# Patient Record
Sex: FEMALE | Race: WHITE
[De-identification: names, ages, dates, MRNs, and addresses within clinical notes are randomized per-mention and may not be internally consistent; named-entity substitution may affect disease eponyms.]

---

## 2017-06-26 ENCOUNTER — HOSPITAL ENCOUNTER (OUTPATIENT)
Dept: HOSPITAL 57 - BURRAD | Age: 82
Discharge: HOME | End: 2017-06-26
Payer: MEDICARE

## 2017-06-26 DIAGNOSIS — M54.6: ICD-10-CM

## 2017-06-26 DIAGNOSIS — M41.9: ICD-10-CM

## 2017-06-26 DIAGNOSIS — M47.816: ICD-10-CM

## 2017-06-26 DIAGNOSIS — G95.20: ICD-10-CM

## 2017-06-26 DIAGNOSIS — M54.5: Primary | ICD-10-CM

## 2017-06-26 DIAGNOSIS — M51.37: ICD-10-CM

## 2017-06-26 PROCEDURE — 72072 X-RAY EXAM THORAC SPINE 3VWS: CPT

## 2017-06-26 PROCEDURE — 72100 X-RAY EXAM L-S SPINE 2/3 VWS: CPT

## 2017-06-26 NOTE — RAD
THORACIC SPINE THREE VIEWS

6/26/17

 

Comparison is made with a 6/29/13 CT of the thoracic spine done at [facility].

 

The patient has known vertebroplasties at T10, T11, T12, and L2 which stabilize prior compressions. 
The bones are osteoporotic. L1 remains intact. A significant compression fracture of T9 is compresse
d to about the same degree as it was in 2013, about 50 to 60% loss in height. There is also a simila
r compression of T8 which was not present before. The levels above this appear grossly intact, thoug
h the patient's osteoporosis makes fine bony detail difficult to see. 

 

Arteriosclerotic change is seen in the aorta. There are dense annular calcifications in the mitral v
alve. 

 

IMPRESSION:  

1.      50-60% anterior compression of T8, a finding not present in 2013. I do not know if this is r
ecent or not. 

2.      50-60% compression of T9, an old finding and fairly stable. 

3.      Vertebroplasties involving T10, T11, T12 and L2. 

4.      Other findings as listed above. 

 

POS: HOME

## 2017-06-26 NOTE — RAD
LUMBAR SPINE THREE VIEWS

6/26/17

 

Comparison is made with a 6/29/13 CT scan done at [facility] that covered with lumbar spine
. 

 

Scoliosis is present, convexed left. There is a very minimal compression of L4 but it does not seem 
much different than the appearance from 2013. I do not see any new lumbar compressions. The bones ar
e quite osteoporotic which degrades detail. The L2 vertebroplasty is noted. A degenerated disc at L5
-S1 was present previously as well. The SI joints are difficult to evaluate due to overlying gas and
 fecal material. 

 

IMPRESSION:  

Scoliosis, mild degenerative change including degenerative disc disease at L5-S1. Minor compression 
of L4 that is no different than 2013. 

 

POS: HOME

## 2018-03-16 ENCOUNTER — HOSPITAL ENCOUNTER (EMERGENCY)
Dept: HOSPITAL 57 - BURERS | Age: 83
Discharge: NURSING, REHAB OR LONG-TERM CARE | End: 2018-03-16
Payer: MEDICARE

## 2018-03-16 DIAGNOSIS — F41.9: ICD-10-CM

## 2018-03-16 DIAGNOSIS — N39.0: Primary | ICD-10-CM

## 2018-03-16 DIAGNOSIS — I10: ICD-10-CM

## 2018-03-16 DIAGNOSIS — Z79.01: ICD-10-CM

## 2018-03-16 DIAGNOSIS — Z79.899: ICD-10-CM

## 2018-03-16 DIAGNOSIS — G20: ICD-10-CM

## 2018-03-16 DIAGNOSIS — E78.5: ICD-10-CM

## 2018-03-16 DIAGNOSIS — M81.0: ICD-10-CM

## 2018-03-16 DIAGNOSIS — I48.91: ICD-10-CM

## 2018-03-16 DIAGNOSIS — Z86.73: ICD-10-CM

## 2018-03-16 DIAGNOSIS — M85.80: ICD-10-CM

## 2018-03-16 LAB
BACTERIA UR QL AUTO: (no result) HPF
RBC UR QL AUTO: (no result) HPF (ref 0–3)
SP GR UR STRIP: 1.02 (ref 1–1.03)
UNIDENT CRYS #/AREA URNS HPF: (no result) HPF
URNS CMNT MICRO: (no result)
WBC UR QL AUTO: (no result) HPF (ref 0–3)

## 2018-03-16 PROCEDURE — 87086 URINE CULTURE/COLONY COUNT: CPT

## 2018-03-16 PROCEDURE — 51701 INSERT BLADDER CATHETER: CPT

## 2018-03-16 PROCEDURE — 81015 MICROSCOPIC EXAM OF URINE: CPT

## 2018-03-16 PROCEDURE — A4353 INTERMITTENT URINARY CATH: HCPCS

## 2018-03-16 PROCEDURE — 81003 URINALYSIS AUTO W/O SCOPE: CPT

## 2018-03-23 ENCOUNTER — HOSPITAL ENCOUNTER (EMERGENCY)
Dept: HOSPITAL 57 - BURERS | Age: 83
Discharge: ANOTHER HOSPITAL | End: 2018-03-23
Payer: MEDICARE

## 2018-03-23 ENCOUNTER — HOSPITAL ENCOUNTER (INPATIENT)
Dept: HOSPITAL 92 - ERS | Age: 83
LOS: 5 days | Discharge: NURSING, REHAB OR LONG-TERM CARE | DRG: 640 | End: 2018-03-28
Attending: INTERNAL MEDICINE | Admitting: INTERNAL MEDICINE
Payer: MEDICARE

## 2018-03-23 VITALS — BODY MASS INDEX: 19.5 KG/M2

## 2018-03-23 DIAGNOSIS — I10: ICD-10-CM

## 2018-03-23 DIAGNOSIS — I48.0: ICD-10-CM

## 2018-03-23 DIAGNOSIS — F32.9: ICD-10-CM

## 2018-03-23 DIAGNOSIS — Z86.73: ICD-10-CM

## 2018-03-23 DIAGNOSIS — I48.91: ICD-10-CM

## 2018-03-23 DIAGNOSIS — E78.5: ICD-10-CM

## 2018-03-23 DIAGNOSIS — Z79.899: ICD-10-CM

## 2018-03-23 DIAGNOSIS — G93.40: ICD-10-CM

## 2018-03-23 DIAGNOSIS — M81.0: ICD-10-CM

## 2018-03-23 DIAGNOSIS — N39.0: ICD-10-CM

## 2018-03-23 DIAGNOSIS — Z66: ICD-10-CM

## 2018-03-23 DIAGNOSIS — F41.9: ICD-10-CM

## 2018-03-23 DIAGNOSIS — F02.80: ICD-10-CM

## 2018-03-23 DIAGNOSIS — E03.9: ICD-10-CM

## 2018-03-23 DIAGNOSIS — K59.00: ICD-10-CM

## 2018-03-23 DIAGNOSIS — Z79.01: ICD-10-CM

## 2018-03-23 DIAGNOSIS — G20: ICD-10-CM

## 2018-03-23 DIAGNOSIS — M19.91: ICD-10-CM

## 2018-03-23 DIAGNOSIS — E44.0: ICD-10-CM

## 2018-03-23 DIAGNOSIS — B96.89: ICD-10-CM

## 2018-03-23 DIAGNOSIS — K21.9: ICD-10-CM

## 2018-03-23 DIAGNOSIS — N39.0: Primary | ICD-10-CM

## 2018-03-23 DIAGNOSIS — K52.89: ICD-10-CM

## 2018-03-23 DIAGNOSIS — E86.0: Primary | ICD-10-CM

## 2018-03-23 DIAGNOSIS — G62.9: ICD-10-CM

## 2018-03-23 DIAGNOSIS — Z95.0: ICD-10-CM

## 2018-03-23 LAB
ACTUAL BICARBONATE (HCO3V): 26 MEQ/L (ref 24–30)
ALBUMIN SERPL BCG-MCNC: 3.7 G/DL (ref 3.4–4.8)
ALP SERPL-CCNC: 68 U/L (ref 40–150)
ALT SERPL W P-5'-P-CCNC: 7 U/L (ref 8–55)
ANION GAP SERPL CALC-SCNC: 15 MMOL/L (ref 10–20)
AST SERPL-CCNC: 23 U/L (ref 5–34)
BACTERIA UR QL AUTO: (no result) HPF
BASE EXCESS BLDA CALC-SCNC: 0.7 MEQ/L
BILIRUB SERPL-MCNC: 0.6 MG/DL (ref 0.2–1.2)
BUN SERPL-MCNC: 22 MG/DL (ref 9.8–20.1)
CALCIUM SERPL-MCNC: 10.1 MG/DL (ref 7.8–10.44)
CHLORIDE SERPL-SCNC: 106 MMOL/L (ref 98–107)
CK MB SERPL-MCNC: 1.9 NG/ML (ref 0–6.6)
CO2 SERPL-SCNC: 27 MMOL/L (ref 23–31)
CREAT CL PREDICTED SERPL C-G-VRATE: 0 ML/MIN (ref 70–130)
GLOBULIN SER CALC-MCNC: 3.6 G/DL (ref 2.4–3.5)
GLUCOSE SERPL-MCNC: 128 MG/DL (ref 83–110)
HEMOGLOBIN (HB): 15.7 G/DL (ref 11.7–16.1)
HGB BLD-MCNC: 14.9 G/DL (ref 12–16)
HYALINE CASTS #/AREA URNS LPF: (no result) LPF
MCH RBC QN AUTO: 30.7 PG (ref 27–31)
MCV RBC AUTO: 91.8 FL (ref 81–99)
MDIFF COMPLETE?: YES
NEUTS HYPERSEG BLD QL SMEAR: SLIGHT
PLATELET # BLD AUTO: 278 THOU/UL (ref 130–400)
POTASSIUM SERPL-SCNC: 4.8 MMOL/L (ref 3.5–5.1)
PROT UR STRIP.AUTO-MCNC: (no result) MG/DL
RBC # BLD AUTO: 4.84 MILL/UL (ref 4.2–5.4)
SODIUM SERPL-SCNC: 143 MMOL/L (ref 136–145)
SP GR UR STRIP: 1.01 (ref 1–1.03)
TROPONIN I SERPL DL<=0.01 NG/ML-MCNC: 0.01 NG/ML (ref ?–0.03)
UNIDENT CRYS #/AREA URNS HPF: (no result) HPF
URNS CMNT MICRO: (no result)
WBC # BLD AUTO: 7.7 THOU/UL (ref 4.8–10.8)
WBC UR QL AUTO: (no result) HPF (ref 0–3)

## 2018-03-23 PROCEDURE — 84484 ASSAY OF TROPONIN QUANT: CPT

## 2018-03-23 PROCEDURE — 96366 THER/PROPH/DIAG IV INF ADDON: CPT

## 2018-03-23 PROCEDURE — 87186 SC STD MICRODIL/AGAR DIL: CPT

## 2018-03-23 PROCEDURE — 82805 BLOOD GASES W/O2 SATURATION: CPT

## 2018-03-23 PROCEDURE — 80053 COMPREHEN METABOLIC PANEL: CPT

## 2018-03-23 PROCEDURE — 87149 DNA/RNA DIRECT PROBE: CPT

## 2018-03-23 PROCEDURE — 83605 ASSAY OF LACTIC ACID: CPT

## 2018-03-23 PROCEDURE — 85025 COMPLETE CBC W/AUTO DIFF WBC: CPT

## 2018-03-23 PROCEDURE — 87077 CULTURE AEROBIC IDENTIFY: CPT

## 2018-03-23 PROCEDURE — 87040 BLOOD CULTURE FOR BACTERIA: CPT

## 2018-03-23 PROCEDURE — S0028 INJECTION, FAMOTIDINE, 20 MG: HCPCS

## 2018-03-23 PROCEDURE — 74018 RADEX ABDOMEN 1 VIEW: CPT

## 2018-03-23 PROCEDURE — 82553 CREATINE MB FRACTION: CPT

## 2018-03-23 PROCEDURE — 36416 COLLJ CAPILLARY BLOOD SPEC: CPT

## 2018-03-23 PROCEDURE — 51702 INSERT TEMP BLADDER CATH: CPT

## 2018-03-23 PROCEDURE — 70450 CT HEAD/BRAIN W/O DYE: CPT

## 2018-03-23 PROCEDURE — 87086 URINE CULTURE/COLONY COUNT: CPT

## 2018-03-23 PROCEDURE — 71045 X-RAY EXAM CHEST 1 VIEW: CPT

## 2018-03-23 PROCEDURE — 93005 ELECTROCARDIOGRAM TRACING: CPT

## 2018-03-23 PROCEDURE — A4216 STERILE WATER/SALINE, 10 ML: HCPCS

## 2018-03-23 PROCEDURE — 36415 COLL VENOUS BLD VENIPUNCTURE: CPT

## 2018-03-23 PROCEDURE — 74176 CT ABD & PELVIS W/O CONTRAST: CPT

## 2018-03-23 PROCEDURE — 81003 URINALYSIS AUTO W/O SCOPE: CPT

## 2018-03-23 PROCEDURE — A4353 INTERMITTENT URINARY CATH: HCPCS

## 2018-03-23 PROCEDURE — 81015 MICROSCOPIC EXAM OF URINE: CPT

## 2018-03-23 PROCEDURE — 96367 TX/PROPH/DG ADDL SEQ IV INF: CPT

## 2018-03-23 PROCEDURE — 85610 PROTHROMBIN TIME: CPT

## 2018-03-23 PROCEDURE — 96365 THER/PROPH/DIAG IV INF INIT: CPT

## 2018-03-23 NOTE — RAD
PORTABLE  AP CHEST X-RAY:

3/23/18

 

HISTORY: 

Altered mental status, confused. 

 

COMPARISON:  

4/25/17.

 

FINDINGS:  

Dual lead left subclavian cardiac pacemaking device again noted in place. Loop recording device again
 overlies the left chest. Vertebroplasty changes in thoracic and upper lumbar spine are again present
. Vascular calcifications again seen in a mildly ectatic thoracic aorta. There are mild chronic lung 
changes. Bilateral breast prostheses are present. Lungs are clear. There has been no interval change 
from prior exam.

 

IMPRESSION:  

1.      No acute cardiopulmonary process. 

2.      Osteopenia.

 

POS: [facility]

## 2018-03-23 NOTE — CT
NONCONTRAST CT ABDOMEN AND PELVIS

3/23/18

 

HISTORY: 

Altered mental status, confusion, combative, abdominal tenderness. 

 

COMPARISON:  

6/24/17.

 

FINDINGS:  

There are peripherally calcified bilateral breast prostheses again noted. There is partial visualizat
ion of the cardiac pacemaking leads. 

 

Vertebroplasty changes are seen at multiple levels lower thoracic spine as well as involving the lumb
ar spine. Vascular calcifications seen in the visualized thoracic aorta as well as involving the abdo
sherif aorta and iliac arteries and coronary arteries. 

 

There is a tiny left pleural effusion with atelectasis. Atelectasis also present at the right lung ba
se. 

 

Again noted is a moderate sized hiatal hernia. Walls of the stomach do appear to be thickened, this c
ould be related to incomplete distention although thickening related to gastritis could not be exclud
ed. 

 

There is a stable low density lesion again present in the superior pole left kidney. This was also se
en on prior study on 2/2/16 and probably represents a renal cyst. 

 

Again noted is the calcification in the region of the pancreatic head. This is in close proximity to 
the common duct, but I am unsure if this adjacent to the common duct or within the common duct. Never
theless, this is a stable finding compared to studies in 2017 and 2016.

 

The liver, spleen, pancreas, and right kidney demonstrate a grossly normal nonenhanced CT appearance.
 There is stable thickening of the bilateral adrenal glands without discrete nodule present. 

 

Uterus demonstrates a grossly normal CAT appearance for patient's age. 

 

Cox catheter is present in the urinary bladder, and there is also gas in the urinary bladder probab
ly related to recent catheterization. In addition, there is increased density material in the urinary
 bladder, in the absence of contrast administration, this suggests hemorrhage within the urinary blad
fouzia. There is a large amount of retained fecal material seen within the rectum with moderate amount o
f retained fecal material seen throughout the remainder of the colon suggesting constipation. There i
s minimal stranding in the presacral space posterior to the colon. Stercoral colitis cannot be entire
ly excluded based on this exam. 

 

Degenerative changes are seen in the spine and there are stable postsurgical changes of the right hip
. There is osteopenia. 

 

There has been no other interval change from the prior exam.

 

IMPRESSION:  

1.      Moderate sized hiatal hernia with thickening of the walls of the stomach. This could be relat
ed to incomplete distention, but gastritis is a differential consideration as well. 

2.      Large amount of retained fecal material in the colon with mild presacral inflammatory changes
 seen. Stercoral colitis could not entirely excluded based on this exam. 

3.      Constipation.

4.      Stable hypodense left renal lesion statistically likely representing a cyst. 

5.      Calcification in the pancreatic head near the region of the common duct. However, this is sta
ble since study on 2/2/16.

 

POS: [facility]

## 2018-03-23 NOTE — CT
EXAM:

NONCONTRAST HEAD CT

3/23/18

 

HISTORY: 

Altered mental status. 

 

COMPARISON:  

2/2/16.

 

TECHNIQUE:   

Noncontrast head CT is performed from the skull base to skull vertex. 

 

FINDINGS:  

No  parenchymal hemorrhage. No extra-axial hematoma. No midline shift. Basilar cisterns are patent. B
rain volume, age appropriate. Cortical gray-white matter differentiation is preserved.

 

Ventricle and sulci are patent and symmetric. Confluent white matter hypodensities due to chronic sma
ll vessel ischemic change. 

 

Stable calcifications/mineralization of the left deep gray matter structures, at the level of the gen
u of the left internal capsule. 

 

Calvarium is intact. Adequate aeration of the sinuses and mastoid air cells. Stable foreign body proj
ecting over the anterior aspect of the left frontal scalp.

 

IMPRESSION:  

1.      No acute intracranial process. 

2.      Age appropriate atrophy.

3.      Chronic small vessel ischemic change of the white matter. 

 

POS: [facility]

## 2018-03-24 LAB
CRYSTALS URNS QL MICRO: (no result) HPF
HEV IGM SER QL: 98.9 (ref 0–7.99)
HYALINE CASTS #/AREA URNS LPF: (no result) LPF
PATHC CAST-AUWI FLAG: 144.19 (ref 0–2.49)
PROT UR STRIP.AUTO-MCNC: (no result) MG/DL
RBC UR QL AUTO: (no result) HPF (ref 0–3)
SP GR UR STRIP: 1.02 (ref 1–1.04)
SPERM-AUWI FLAG: 0 (ref 0–9.9)
WBC UR QL AUTO: (no result) HPF (ref 0–3)
YEAST-AUWI FLAG: 839.9 (ref 0–25)

## 2018-03-24 RX ADMIN — FAMOTIDINE SCH MG: 10 INJECTION, SOLUTION INTRAVENOUS at 21:14

## 2018-03-24 NOTE — HP
PRIMARY CARE PHYSICIAN:  Dr. Rayna Frias.

 

REASON FOR ADMISSION:  Transfer from [place] [facility] for altered mental status.

 

HISTORY OF PRESENT ILLNESS:  This is an 87-year-old female, who lives at the nursing home in [place]
.  From there, she was taken to [facility] for altered mental status.  The patient is no
t able to provide any history.  The patient was taken to [place] [facility] and she was found wi
th urinary tract infection.  Based on lab testing, she was also found clinically dehydrated.  Patient
 was hypertensive and febrile.  She was saturating 88% on room air and temperature was 100.4 and bloo
d pressure was 211/91.  The patient was given Rocephin and vancomycin, and subsequently, this patient
 was transferred to our emergency room for admission.  In our emergency room, patient was not able to
 provide any history.  She was still hypertensive.  Subsequently, this patient was admitted to [facility] floor.

 

REVIEW OF SYSTEMS:  All review of systems tried to review with the patient, but unable to review at t
his point, because of altered mental status.

 

ALLERGIES:  ASPIRIN, PENICILLIN, PLAVIX, IODINE.

 

The patient is not able to get MRI, because of metal above the left eye.

 

CURRENT HOME MEDICATIONS:  Lipitor 40 mg p.o. daily, carbidopa/levodopa 25/100 one tablet 3 times tai
ly, Cardizem- mg p.o. daily, Synthroid 88 mcg p.o. daily, digoxin 125 mcg p.o. daily, ferrous s
ulfate 325 mg p.o. daily, MiraLax 17 grams p.o. daily, potassium chloride 20 mEq p.o. daily, vitamin 
D3 2000 units p.o. daily, Xarelto 20 mg p.o. daily, Remeron 15 mg p.o. daily, Aricept 5 mg p.o. daily
, ranitidine 150 mg p.o. daily.

 

PAST MEDICAL HISTORY:  Osteoarthritis, osteoporosis, hypertension, atrial fibrillation, chronic antic
oagulation, history of CVA, Parkinson's disease, dementia, hypothyroidism, dyslipidemia, gastroesopha
geal reflux disease, chronic deconditioning.

 

PAST SURGICAL HISTORY:  Breast augmentation.  Hip surgery.

 

PAST PSYCHIATRIC HISTORY:  Anxiety and depression.

 

SOCIAL HISTORY:  Patient lives at nursing home.  No history of tobacco, alcohol, or illicit drug abus
e.

 

FAMILY HISTORY:  No strong family history of premature coronary artery disease, stroke, or cancer.

 

EMERGENCY ROOM COURSE:  Patient is given vancomycin and Rocephin at [place] [facility].

 

PHYSICAL EXAMINATION:

VITAL SIGNS:  On arrival to [place] [facility], blood pressure 211/91, pulse 77, respiratory rat
e 24, temperature 100.4, saturation 88% on room air, weight 53.9 kilograms.

GENERAL:  Patient is currently hypertensive, ill-appearing, not following any command.

HEENT:  Head:  Normocephalic, atraumatic.  Eyes:  Pupils round, reactive to light.  No nystagmus.  EN
T:  Dry mucous membrane, no oral lesion, no pharyngeal erythema, no exudate.

NECK:  Supple, no JVD, no thyromegaly, no carotid bruit, no jugular venous distention.

LUNGS:  Clear to auscultation, though air entry reduced on both sides at base.  No wheeze, no rhonchi
, no rales.  No accessory muscles of respiration in use.

CARDIAC:  S1 and S2 appear regular.  Pacemaker in place in right chest wall.

ABDOMEN:  Soft, bowel sounds present, nontender, nondistended.  Bowel sounds present, no organomegaly
, no mass.

GENITOURINARY:  Cox catheter in place.

BACK EXAMINATION:  Unremarkable.  No CVA tenderness.

EXTREMITIES:  Upper extremities, passive movement of all joints are normal.  Lower extremities:  Pass
sharron movement of all joints are normal.  No edema.  Good peripheral pulsation.

SKIN:  No skin rash.

HEMATOLOGICAL SYSTEM:  No lymphadenopathy.

PSYCHIATRIC:  Flat affect.

NEUROLOGIC:  She is moving all 4 limbs.  Detailed neurological examination is not possible, because s
he is not following any command.

 

SIGNIFICANT LABORATORY DATA:  EKG showing pacemaker rhythm.  CT brain, based on my review, no acute i
ntracranial process, age appropriate atrophy, chronic small vessel ischemic changes.  Abdomen and pel
vis CT scan showing moderate-sized hiatal hernia with thickening of the walls of the stomach.  Large 
amount of fecal material in colon, constipation, calcification of pancreatic head.  Chest x-ray, base
d on my review, no acute cardiopulmonary process, osteopenia, pacemaker in place.

 

CBC:  WBC 7.7, hemoglobin 14.9, platelet 278 with bandemia.

 

VBG:  pH 7.38, CO2 of 45.2, bicarbonate 26, O2 of 62.7.  BMP:  Sodium 143, potassium 4.8, chloride 10
6, carbon dioxide 27, anion gap 15, BUN 22, creatinine 0.99, glucose 128, calcium 10.1.  Lactic acid 
2.1.

 

LFT:  AST 23, ALT 7, alkaline phosphatase 68, albumin 3.7, CK-MB 1.9, troponin I 0.015.  Urinalysis s
uggestive of urinary tract infection.

 

ASSESSMENT AND PLAN:

1.  Acute encephalopathy, multifactorial etiology including urinary tract infection and dehydration.

2.  Urinary tract infection.  We will continue levofloxacin IV daily.  Follow up on urine culture res
ult and modify antibiotic therapy based on culture and sensitivity result.

3.  Stercoral colitis with severe constipation.  We will give her Fleet enema.

4.  Moderate hiatal hernia with thickening wall of the stomach.  We will continue Pepcid 20 mg IV b.i
.d.

5.  Parkinson's disease.  Continue Sinemet-CR 25/100 one tablet q.i.d.

6.  Dementia.  Continue Aricept 5 mg p.o. at bedtime.

7.  Atrial fibrillation, currently rate controlled.  Continue digoxin 0.125 mg p.o. daily, Cardizem-C
D 240 mg p.o. daily along with chronic anticoagulation with Xarelto 20 mg p.o. daily.

8.  History of sick sinus syndrome with pacemaker.

9.  Hypothyroidism.  Continue Synthroid 88 mcg p.o. daily.

10.  Dyslipidemia.  Continue Lipitor 40 mg p.o. at bedtime.

11.  Bandemia, likely related with underlying infection.

12.  Dehydration.  Patient will be given IV fluid with dextrose half-normal saline at 75 mL per hour.


13.  Moderate protein-calorie malnutrition.  The patient will be given nutritional supplement when pa
jonathannt able to take p.o.

14.  Deep venous thrombosis prophylaxis.  Patient is already on Xarelto therapy.

15.  Gastrointestinal prophylaxis.  Pepcid 20 mg IV b.i.d.

16.  Code status.  At this point, the patient will be kept as a FULL CODE.  Patient does not have any
 surrogate decision maker.  The patient cannot provide any history at this point, so unable to confir
m and that is why we will keep as a FULL CODE status.

 

Disposition plan, based on clinical course.  We are expecting patient's stay in hospital more than 2 
midnights.  Plan of care discussed with the patient in detail.

## 2018-03-25 LAB
ALBUMIN SERPL BCG-MCNC: 3.1 G/DL (ref 3.4–4.8)
ALP SERPL-CCNC: 47 U/L (ref 40–150)
ALT SERPL W P-5'-P-CCNC: 9 U/L (ref 8–55)
ANION GAP SERPL CALC-SCNC: 8 MMOL/L (ref 10–20)
AST SERPL-CCNC: 12 U/L (ref 5–34)
BASOPHILS # BLD AUTO: 0.1 THOU/UL (ref 0–0.2)
BASOPHILS NFR BLD AUTO: 0.7 % (ref 0–1)
BILIRUB SERPL-MCNC: 0.6 MG/DL (ref 0.2–1.2)
BUN SERPL-MCNC: 16 MG/DL (ref 9.8–20.1)
CALCIUM SERPL-MCNC: 8.9 MG/DL (ref 7.8–10.44)
CHLORIDE SERPL-SCNC: 103 MMOL/L (ref 98–107)
CO2 SERPL-SCNC: 29 MMOL/L (ref 23–31)
CREAT CL PREDICTED SERPL C-G-VRATE: 41 ML/MIN (ref 70–130)
EOSINOPHIL # BLD AUTO: 0.4 THOU/UL (ref 0–0.7)
EOSINOPHIL NFR BLD AUTO: 4.9 % (ref 0–10)
GLOBULIN SER CALC-MCNC: 2.7 G/DL (ref 2.4–3.5)
GLUCOSE SERPL-MCNC: 128 MG/DL (ref 83–110)
HGB BLD-MCNC: 13.9 G/DL (ref 12–16)
LYMPHOCYTES # BLD: 1.8 THOU/UL (ref 1.2–3.4)
LYMPHOCYTES NFR BLD AUTO: 23.9 % (ref 21–51)
MCH RBC QN AUTO: 30.3 PG (ref 27–31)
MCV RBC AUTO: 95.4 FL (ref 81–99)
MONOCYTES # BLD AUTO: 1 THOU/UL (ref 0.11–0.59)
MONOCYTES NFR BLD AUTO: 13.6 % (ref 0–10)
NEUTROPHILS # BLD AUTO: 4.3 THOU/UL (ref 1.4–6.5)
NEUTROPHILS NFR BLD AUTO: 57 % (ref 42–75)
PLATELET # BLD AUTO: 256 THOU/UL (ref 130–400)
POTASSIUM SERPL-SCNC: 3.8 MMOL/L (ref 3.5–5.1)
RBC # BLD AUTO: 4.59 MILL/UL (ref 4.2–5.4)
SODIUM SERPL-SCNC: 136 MMOL/L (ref 136–145)
WBC # BLD AUTO: 7.6 THOU/UL (ref 4.8–10.8)

## 2018-03-25 RX ADMIN — FAMOTIDINE SCH MG: 10 INJECTION, SOLUTION INTRAVENOUS at 11:07

## 2018-03-25 NOTE — PDOC.PN
- Subjective


Encounter Start Date: 03/25/18


Encounter Start Time: 09:10


-: old records requested/rev





Patient seen and examined. No new complaints. No overnight events





- Objective


Resuscitation Status: 


 











Resuscitation Status           DNR:Do Not Resuscitate














MAR Reviewed: Yes


Vital Signs & Weight: 


 Vital Signs (12 hours)











  Temp Pulse Resp BP Pulse Ox


 


 03/25/18 05:17      94 L


 


 03/25/18 05:01  97.8 F  76  20  137/76  96








 Weight











Admit Weight                   105 lb 9.6 oz


 


Weight                         111 lb 8 oz














I&O: 


 











 03/24/18 03/25/18 03/26/18





 06:59 06:59 06:59


 


Intake Total  1860 


 


Output Total  675 


 


Balance  1185 











Result Diagrams: 


 03/25/18 05:59





 03/25/18 05:59


EKG Reviewed by me: Yes





Phys Exam





- Physical Examination


Constitutional: NAD


HEENT: PERRLA, sclera anicteric


dry MM


Neck: no JVD, supple


Respiratory: no wheezing, no rales, no rhonchi


Cardiovascular: RRR, no significant murmur, no rub


Gastrointestinal: soft, non-tender, no distention, positive bowel sounds


Musculoskeletal: no edema, pulses present


Neurological: moves all 4 limbs


Lymphatic: no nodes


Psychiatric: normal affect


Skin: no rash, normal turgor





Dx/Plan


(1) UTI (urinary tract infection)


Status: Acute   





(2) Dehydration


Code(s): E86.0 - DEHYDRATION   Status: Acute   





(3) Encephalopathy acute


Code(s): G93.40 - ENCEPHALOPATHY, UNSPECIFIED   Status: Acute   





(4) Protein-calorie malnutrition, moderate


Code(s): E44.0 - MODERATE PROTEIN-CALORIE MALNUTRITION   Status: Chronic   





(5) Physical deconditioning


Code(s): R53.81 - OTHER MALAISE   Status: Chronic   





(6) Paroxysmal atrial fibrillation


Code(s): I48.0 - PAROXYSMAL ATRIAL FIBRILLATION   Status: Chronic   





(7) Dementia


Code(s): F03.90 - UNSPECIFIED DEMENTIA WITHOUT BEHAVIORAL DISTURBANCE   Status: 

Chronic   





(8) GERD (gastroesophageal reflux disease)


Code(s): K21.9 - GASTRO-ESOPHAGEAL REFLUX DISEASE WITHOUT ESOPHAGITIS   Status: 

Chronic   





(9) H/O: CVA (cerebrovascular accident)


Code(s): Z86.73 - PRSNL HX OF TIA (TIA), AND CEREB INFRC W/O RESID DEFICITS   

Status: Chronic   





(10) Hyperlipidemia


Code(s): E78.5 - HYPERLIPIDEMIA, UNSPECIFIED   Status: Chronic   





(11) Hypertension


Code(s): I10 - ESSENTIAL (PRIMARY) HYPERTENSION   Status: Chronic   





(12) Neuropathy


Code(s): G62.9 - POLYNEUROPATHY, UNSPECIFIED   Status: Chronic   





(13) Parkinson disease


Code(s): G20 - PARKINSON'S DISEASE   Status: Chronic   





- Plan


cont current plan of care, pearson catheter, continue antibiotics





* continue gentle IVF


* code status - DNR confirmed


* continue IV levaquin


* follow culture


* diet as tolerated


* medication reviewed as below 


* symptomatic treatment


* continue selected home medication.








Review of Systems





- Review of Systems


Other: 





unable to review due to severe dementia and encephalopathy





- Medications/Allergies


Allergies/Adverse Reactions: 


 Allergies











Allergy/AdvReac Type Severity Reaction Status Date / Time


 


aspirin Allergy Severe  Verified 04/18/17 13:09


 


Penicillins Allergy Severe  Verified 04/18/17 13:09


 


clopidogrel bisulfate Allergy   Verified 04/18/17 13:09





[From Plavix]     


 


Iodine and Iodide Containing Allergy   Verified 04/18/17 13:09





Produc     


 


No MRI metal above left eye Allergy   Uncoded 07/19/13 12:22











Medications: 


 Current Medications





Acetaminophen (Tylenol)  650 mg PO Q4H PRN


   PRN Reason: Headache/Fever or Pain


Hydrocodone Bitart/Acetaminophen (Norco 5/325)  1 tab PO Q4H PRN


   PRN Reason: Moderate Pain (4-6)


Acyclovir (Zovirax)  800 mg PO TID [facility]


   Last Admin: 03/24/18 21:13 Dose:  800 mg


Al Hydroxide/Mg Hydroxide (Maalox)  30 ml PO Q6H PRN


   PRN Reason: Heartburn  or Indigestion


Artificial Tears (Tears Naturale)  0 drop EA EYE PRN PRN


   PRN Reason: Dry Eyes


Atorvastatin Calcium (Lipitor)  40 mg PO HS [facility]


   Last Admin: 03/24/18 21:14 Dose:  40 mg


Carbidopa/Levodopa (Sinemet Cr 50/200)  0.5 tab PO 0700,1200,1500,2100 [facility]


   Last Admin: 03/25/18 06:15 Dose:  0.5 tab


Cholecalciferol (Vitamin D3)  2,000 units PO DAILY [facility]


   Last Admin: 03/24/18 14:46 Dose:  2,000 units


Clonidine (Catapres)  0.1 mg PO BIDPRN PRN


   PRN Reason: sbp>180


Digoxin (Lanoxin)  0.125 mg PO DAILY [facility]


   Last Admin: 03/24/18 14:46 Dose:  0.125 mg


Diltiazem HCl (Cardizem Cd)  240 mg PO DAILY [facility]


   Last Admin: 03/24/18 14:45 Dose:  240 mg


Donepezil HCl (Aricept)  5 mg PO HS [facility]


   Last Admin: 03/24/18 21:14 Dose:  5 mg


Famotidine (Pepcid)  20 mg SLOW IVP BID [facility]


   Last Admin: 03/24/18 21:14 Dose:  20 mg


Ferrous Sulfate (Feosol)  325 mg PO QAM-WM [facility]


   Last Admin: 03/24/18 14:45 Dose:  325 mg


Guaifenesin (Robitussin Sf)  200 mg PO Q4H PRN


   PRN Reason: Cough


Hydralazine HCl (Apresoline)  10 mg SLOW IVP Q4H PRN


   PRN Reason: Systolic BP > 180


Dextrose/Sodium Chloride (D5 1/2 Ns)  1,000 mls @ 75 mls/hr IV .[id number] [facility]


   Last Admin: 03/24/18 21:10 Dose:  1,000 mls


Levofloxacin 500 mg/ Device  100 mls @ 100 mls/hr IVPB 0800 [facility]


   Last Admin: 03/24/18 09:45 Dose:  100 mls


Levothyroxine Sodium (Synthroid)  88 mcg PO 0600 [facility]


   Last Admin: 03/25/18 06:15 Dose:  88 mcg


Loperamide HCl (Imodium)  2 mg PO PRN PRN


   PRN Reason: Diarrhea/Loose Stools


Loratadine (Claritin)  10 mg PO DAILYPRN PRN


   PRN Reason: Sinus Symptoms


Magnesium Hydroxide (Milk Of Magnesium)  30 ml PO DAILYPRN PRN


   PRN Reason: Constipation


Mineral Oil/White Petrolatum (Eucerin Cream)  0 gm TOP BIDPRN PRN


   PRN Reason: Dry Skin


Ondansetron HCl (Zofran Odt)  4 mg PO Q6H PRN


   PRN Reason: Nausea/Vomiting


Ondansetron HCl (Zofran)  4 mg IVP Q6H PRN


   PRN Reason: Nausea/Vomiting


Phenol (Chloraseptic Spray 180 Ml Bot)  0 ml PO PRN PRN


   PRN Reason: Sore Throat


Polyethylene Glycol (Miralax)  17 gm PO DAILY [facility]


   Last Admin: 03/24/18 14:45 Dose:  17 gm


Potassium Chloride (K-Dur)  20 meq PO 0800 [facility]


   Last Admin: 03/24/18 14:45 Dose:  20 meq


Rivaroxaban (Xarelto)  20 mg PO 1700 [facility]


   Last Admin: 03/24/18 17:05 Dose:  20 mg


Saccharomyces Boulardii (Florastor)  250 mg PO DAILY [facility]


   Last Admin: 03/24/18 14:46 Dose:  250 mg


Senna (Senokot)  2 tab PO HSPRN PRN


   PRN Reason: Constipation


Simethicone (Mylicon Chewable)  80 mg PO Q6H PRN


   PRN Reason: Gas Pain


Sodium Chloride (Ocean Nasal Spray 0.65%)  0 ml EA NARE QIDPRN PRN


   PRN Reason: Nasal Congestion


Tramadol HCl (Ultram)  50 mg PO Q6H PRN


   PRN Reason: Pain

## 2018-03-26 RX ADMIN — Medication SCH: at 09:19

## 2018-03-26 RX ADMIN — Medication SCH: at 21:29

## 2018-03-26 NOTE — PDOC.PN
- Subjective


Encounter Start Date: 03/26/18


Encounter Start Time: 09:00





Patient seen and examined. No new complaints. No overnight events


pt is more alert today





- Objective


Resuscitation Status: 


 











Resuscitation Status           DNR:Do Not Resuscitate














MAR Reviewed: Yes


Vital Signs & Weight: 


 Vital Signs (12 hours)











  Temp Pulse Resp BP Pulse Ox


 


 03/26/18 09:26  99.0 F  74  20   98


 


 03/26/18 09:17   74   


 


 03/26/18 09:06  99.0 F  71  20  171/72 H  99


 


 03/26/18 05:22  99.4 F  74  18  159/74 H  96


 


 03/26/18 05:00      96








 Weight











Admit Weight                   105 lb 9.6 oz


 


Weight                         113 lb 9.6 oz














I&O: 


 











 03/25/18 03/26/18 03/27/18





 06:59 06:59 06:59


 


Intake Total 1860 1260 


 


Output Total 675 450 


 


Balance 1185 810 











Result Diagrams: 


 03/25/18 05:59





 03/25/18 05:59


EKG Reviewed by me: Yes





Phys Exam





- Physical Examination


Constitutional: NAD


HEENT: PERRLA, moist MMs, sclera anicteric


Neck: no JVD, supple


Respiratory: no wheezing, no rales, no rhonchi


Cardiovascular: RRR, no significant murmur, no rub


Gastrointestinal: soft, non-tender, no distention, positive bowel sounds


Musculoskeletal: no edema, pulses present


Neurological: non-focal, normal sensation


Lymphatic: no nodes


Psychiatric: normal affect


Skin: no rash, normal turgor





Dx/Plan


(1) UTI (urinary tract infection)


Status: Acute   





(2) Dehydration


Code(s): E86.0 - DEHYDRATION   Status: Acute   





(3) Encephalopathy acute


Code(s): G93.40 - ENCEPHALOPATHY, UNSPECIFIED   Status: Acute   





(4) Protein-calorie malnutrition, moderate


Code(s): E44.0 - MODERATE PROTEIN-CALORIE MALNUTRITION   Status: Chronic   





(5) Physical deconditioning


Code(s): R53.81 - OTHER MALAISE   Status: Chronic   





(6) Paroxysmal atrial fibrillation


Code(s): I48.0 - PAROXYSMAL ATRIAL FIBRILLATION   Status: Chronic   





(7) Dementia


Code(s): F03.90 - UNSPECIFIED DEMENTIA WITHOUT BEHAVIORAL DISTURBANCE   Status: 

Chronic   





(8) GERD (gastroesophageal reflux disease)


Code(s): K21.9 - GASTRO-ESOPHAGEAL REFLUX DISEASE WITHOUT ESOPHAGITIS   Status: 

Chronic   





(9) H/O: CVA (cerebrovascular accident)


Code(s): Z86.73 - PRSNL HX OF TIA (TIA), AND CEREB INFRC W/O RESID DEFICITS   

Status: Chronic   





(10) Hyperlipidemia


Code(s): E78.5 - HYPERLIPIDEMIA, UNSPECIFIED   Status: Chronic   





(11) Hypertension


Code(s): I10 - ESSENTIAL (PRIMARY) HYPERTENSION   Status: Chronic   





(12) Neuropathy


Code(s): G62.9 - POLYNEUROPATHY, UNSPECIFIED   Status: Chronic   





(13) Parkinson disease


Code(s): G20 - PARKINSON'S DISEASE   Status: Chronic   





- Plan


cont current plan of care, continue antibiotics





* DC tele


* transfer to medical


* continue empiric antibiotics


* so far culture negative.


* medication reviewed as below


* symptomatic treatment


* eventual placement to snu when stable








Review of Systems





- Review of Systems


Other: 





not reliable due to her level of cognitive status





- Medications/Allergies


Allergies/Adverse Reactions: 


 Allergies











Allergy/AdvReac Type Severity Reaction Status Date / Time


 


aspirin Allergy Severe  Verified 04/18/17 13:09


 


Penicillins Allergy Severe  Verified 04/18/17 13:09


 


clopidogrel bisulfate Allergy   Verified 04/18/17 13:09





[From Plavix]     


 


Iodine and Iodide Containing Allergy   Verified 04/18/17 13:09





Produc     


 


No MRI metal above left eye Allergy   Uncoded 07/19/13 12:22











Medications: 


 Current Medications





Acetaminophen (Tylenol)  650 mg PO Q4H PRN


   PRN Reason: Headache/Fever or Pain


Hydrocodone Bitart/Acetaminophen (Norco 5/325)  1 tab PO Q4H PRN


   PRN Reason: Moderate Pain (4-6)


Acyclovir (Zovirax)  800 mg PO TID [facility]


   Last Admin: 03/26/18 09:16 Dose:  800 mg


Al Hydroxide/Mg Hydroxide (Maalox)  30 ml PO Q6H PRN


   PRN Reason: Heartburn  or Indigestion


Artificial Tears (Tears Naturale)  0 drop EA EYE PRN PRN


   PRN Reason: Dry Eyes


Atorvastatin Calcium (Lipitor)  40 mg PO HS [facility]


   Last Admin: 03/25/18 20:17 Dose:  40 mg


Carbidopa/Levodopa (Sinemet Cr 50/200)  0.5 tab PO 0700,1200,1500,2100 [facility]


   Last Admin: 03/26/18 05:58 Dose:  0.5 tab


Cholecalciferol (Vitamin D3)  2,000 units PO DAILY [facility]


   Last Admin: 03/26/18 09:18 Dose:  2,000 units


Clonidine (Catapres)  0.1 mg PO BIDPRN PRN


   PRN Reason: sbp>180


Digoxin (Lanoxin)  0.125 mg PO DAILY [facility]


   Last Admin: 03/26/18 09:17 Dose:  0.125 mg


Diltiazem HCl (Cardizem Cd)  240 mg PO DAILY [facility]


   Last Admin: 03/26/18 09:17 Dose:  240 mg


Donepezil HCl (Aricept)  5 mg PO HS [facility]


   Last Admin: 03/25/18 20:17 Dose:  5 mg


Famotidine (Pepcid)  20 mg PO DAILY [facility]


   Last Admin: 03/26/18 09:18 Dose:  20 mg


Ferrous Sulfate (Feosol)  325 mg PO QA[facility]


   Last Admin: 03/26/18 09:17 Dose:  325 mg


Guaifenesin (Robitussin Sf)  200 mg PO Q4H PRN


   PRN Reason: Cough


Hydralazine HCl (Apresoline)  10 mg SLOW IVP Q4H PRN


   PRN Reason: Systolic BP > 180


Dextrose/Sodium Chloride (D5 1/2 Ns)  1,000 mls @ 75 mls/hr IV .[id number] [facility]


   Last Admin: 03/26/18 01:39 Dose:  1,000 mls


Levofloxacin 500 mg/ Device  100 mls @ 100 mls/hr IVPB 0800 [facility]


   Last Admin: 03/26/18 09:19 Dose:  100 mls


Levothyroxine Sodium (Synthroid)  88 mcg PO 0600 [facility]


   Last Admin: 03/26/18 05:26 Dose:  88 mcg


Loperamide HCl (Imodium)  2 mg PO PRN PRN


   PRN Reason: Diarrhea/Loose Stools


Loratadine (Claritin)  10 mg PO DAILYPRN PRN


   PRN Reason: Sinus Symptoms


Magnesium Hydroxide (Milk Of Magnesium)  30 ml PO DAILYPRN PRN


   PRN Reason: Constipation


Mineral Oil/White Petrolatum (Eucerin Cream)  0 gm TOP BIDPRN PRN


   PRN Reason: Dry Skin


Ondansetron HCl (Zofran Odt)  4 mg PO Q6H PRN


   PRN Reason: Nausea/Vomiting


Ondansetron HCl (Zofran)  4 mg IVP Q6H PRN


   PRN Reason: Nausea/Vomiting


Phenol (Chloraseptic Spray 180 Ml Bot)  0 ml PO PRN PRN


   PRN Reason: Sore Throat


Polyethylene Glycol (Miralax)  17 gm PO DAILY [facility]


   Last Admin: 03/26/18 09:16 Dose:  17 gm


Potassium Chloride (K-Dur)  20 meq PO 0800 [facility]


   Last Admin: 03/26/18 09:17 Dose:  20 meq


Rivaroxaban (Xarelto)  20 mg PO 1700 [facility]


   Last Admin: 03/25/18 19:12 Dose:  20 mg


Saccharomyces Boulardii (Florastor)  250 mg PO DAILY [facility]


   Last Admin: 03/26/18 09:17 Dose:  250 mg


Senna (Senokot)  2 tab PO HSPRN PRN


   PRN Reason: Constipation


Simethicone (Mylicon Chewable)  80 mg PO Q6H PRN


   PRN Reason: Gas Pain


Sodium Chloride (Ocean Nasal Spray 0.65%)  0 ml EA NARE QIDPRN PRN


   PRN Reason: Nasal Congestion


Sodium Chloride (Flush - Normal Saline)  10 ml IVF Q12HR [facility]


   Last Admin: 03/26/18 09:19 Dose:  Not Given


Sodium Chloride (Flush - Normal Saline)  10 ml IVF PRN PRN


   PRN Reason: Saline Flush


Tramadol HCl (Ultram)  50 mg PO Q6H PRN


   PRN Reason: Pain

## 2018-03-27 LAB
INR PPP: 1.4
PROTHROMBIN TIME: 17.3 SEC (ref 12–14.7)

## 2018-03-27 RX ADMIN — Medication SCH: at 08:36

## 2018-03-27 RX ADMIN — Medication SCH: at 21:04

## 2018-03-27 NOTE — RAD
SINGLE VIEW OF THE ABDOMEN:

3/27/18

 

COMPARISON:  

CT abdomen/pelvis 3/23/18. 

 

HISTORY: 

Abdominal pain. 

 

FINDINGS:  

Single view of the abdomen shows a nonspecific, nonobstructed bowel gas pattern. Air and stool are se
en in the rectum. Vertebroplasty cement is seen in the spine. Calcifications are seen in the aorta. T
he patient has hardware in the right femur.

 

IMPRESSION:  

Nonobstructive bowel gas pattern. 

 

POS: STEVE

## 2018-03-27 NOTE — PDOC.PN
- Subjective


Encounter Start Date: 03/27/18


Encounter Start Time: 12:07





Patient seen and examined, no new issues per nursing staff. 





- Objective


Resuscitation Status: 


 











Resuscitation Status           DNR:Do Not Resuscitate














Vital Signs & Weight: 


 Vital Signs (12 hours)











  Temp Pulse Resp BP BP Pulse Ox


 


 03/27/18 11:11  98.3 F  74  18   154/68 H  96


 


 03/27/18 08:35   76    


 


 03/27/18 08:34   76   165/84 H  


 


 03/27/18 08:00  97.8 F  76  14    97


 


 03/27/18 07:48  97.8 F  76  14   155/79 H  97


 


 03/27/18 04:00  97.6 F  75  20   167/75 H  98








 Weight











Admit Weight                   105 lb 9.6 oz


 


Weight                         113 lb 9.6 oz














I&O: 


 











 03/26/18 03/27/18 03/28/18





 06:59 06:59 06:59


 


Intake Total 1260 1764 


 


Output Total 450 400 


 


Balance 810 1364 











Result Diagrams: 


 03/25/18 05:59





 03/25/18 05:59





Phys Exam





- Physical Examination


Constitutional: NAD


HEENT: PERRLA, moist MMs, sclera anicteric


Neck: no nodes, no JVD, supple


Respiratory: no wheezing, no rales, no rhonchi


Cardiovascular: RRR, no significant murmur, no rub


Gastrointestinal: soft, non-tender, no distention


Musculoskeletal: no edema, pulses present





Dx/Plan


(1) Dehydration


Code(s): E86.0 - DEHYDRATION   Status: Acute   





(2) Physical deconditioning


Code(s): R53.81 - OTHER MALAISE   Status: Chronic   





(3) UTI (urinary tract infection)


Status: Acute   





(4) Neuropathy


Code(s): G62.9 - POLYNEUROPATHY, UNSPECIFIED   Status: Chronic   





(5) Parkinson disease


Code(s): G20 - PARKINSON'S DISEASE   Status: Chronic   





(6) Paroxysmal atrial fibrillation


Code(s): I48.0 - PAROXYSMAL ATRIAL FIBRILLATION   Status: Chronic   





- Plan





* Continue current plan of care for now


* cultures pending


* DC plans once patient stable and C&S available


* likely DC to SNF?


* No family at bedside

## 2018-03-27 NOTE — PQF
CLINICAL DOCUMENTATION IMPROVEMENT CLARIFICATION FORM:  ICD-10 Updated

PLEASE DO AN ADDENDUM TO THE PROGRESS NOTE WITH ANY DOCUMENTATION UPDATES OR 
ADDITIONS AND CARRY THROUGH TO DC SUMMARY.   THANK YOU.



DATE:    3/27/18                                     ATTN: Dr. Dominguez



Please exercise your independent, professional judgment in responding to the 
clarification form. 

Clinical indicators are provided on the bottom of this form for your review



Please check appropriate box(es):

[ x ] Sepsis due to: UTI__________________________

[  ] SIRS due to non-infectious process (please specify etiology) ______________
___________

      [  ] with organ dysfunction     [  ] without organ dysfunction

[  ] Severe sepsis with acute organ dysfunction of: ____________________________
_______

        (Examples: respiratory failure, encephalopathy, other)

[  ] Localized infection without sepsis

[  ] Other diagnosis ___________

[  ] Unable to determine



In addition, please specify:

Present on Admission (POA):  [  ] Yes             [  ] No             [ x ] 
Unable to determine



For continuity of documentation, please document condition throughout progress 
notes and discharge summary.  Thank You.



CLINICAL INDICATORS - SIGNS / SYMPTOMS / LABS



 H&P: PT TAKEN TO [place] [facility] & FOUND WITH UTI.

         SHE WAS SATURATING 88% ON RA & TEMPERATURE .4

         & /91. THE PT WAS GIVEN ROCEPHIN & VANCOMYCIN, &

         TRANSFERRED TO OUR ER FOR ADMISSION.

         /91, PULSE 77, RESP 24

         LACTIC ACID 2.1

         BANDEMIA, LIKELY RELATED WITH UNDERLYING INFECTION





RISKS:

H&P: 87 YRS. LIVES IN NURSING HOME. HX OF CVA, HYPERTENSION, PARKINSON'S 
DISEASE. 

        ASSESSMENT: ACUTE ENCEPHALOPATHY, MULTIFACTORIAL ETIOLOGY, INCLUDING UTI

                                & DEHYDRATION. MODERATE PROTEIN-CALORIE 
MALNUTRITION. 



TREATMENT:

            H&P:  THE PT WAS GIVEN ROCEPHIN & VANCOMYCIN, & TRANS. TO OUR 

                      ER FOR ADMISSION.

 CPOE: 3/24:  LEVAQUIN 500MG IV 









Thank you,

Alcira             

(This form is maintained as a part of the permanent medical record)

 2015 [facility].  All Rights Reserved

Alcira Rossi RN, ISATU [email or web address]    Office: [phone]

                                                              

 

[facility]

## 2018-03-28 VITALS — DIASTOLIC BLOOD PRESSURE: 58 MMHG | SYSTOLIC BLOOD PRESSURE: 131 MMHG

## 2018-03-28 VITALS — TEMPERATURE: 97.8 F

## 2018-03-28 LAB
ALBUMIN SERPL BCG-MCNC: 2.7 G/DL (ref 3.4–4.8)
ALP SERPL-CCNC: 46 U/L (ref 40–150)
ALT SERPL W P-5'-P-CCNC: (no result) U/L (ref 8–55)
ANION GAP SERPL CALC-SCNC: 9 MMOL/L (ref 10–20)
AST SERPL-CCNC: 13 U/L (ref 5–34)
BASOPHILS # BLD AUTO: 0 THOU/UL (ref 0–0.2)
BASOPHILS NFR BLD AUTO: 0.5 % (ref 0–1)
BILIRUB SERPL-MCNC: 0.7 MG/DL (ref 0.2–1.2)
BUN SERPL-MCNC: 7 MG/DL (ref 9.8–20.1)
CALCIUM SERPL-MCNC: 8.2 MG/DL (ref 7.8–10.44)
CHLORIDE SERPL-SCNC: 102 MMOL/L (ref 98–107)
CO2 SERPL-SCNC: 24 MMOL/L (ref 23–31)
CREAT CL PREDICTED SERPL C-G-VRATE: 47 ML/MIN (ref 70–130)
EOSINOPHIL # BLD AUTO: 0.3 THOU/UL (ref 0–0.7)
EOSINOPHIL NFR BLD AUTO: 4.4 % (ref 0–10)
GLOBULIN SER CALC-MCNC: 2.3 G/DL (ref 2.4–3.5)
GLUCOSE SERPL-MCNC: 104 MG/DL (ref 83–110)
HGB BLD-MCNC: 12.6 G/DL (ref 12–16)
LYMPHOCYTES # BLD: 1.7 THOU/UL (ref 1.2–3.4)
LYMPHOCYTES NFR BLD AUTO: 21 % (ref 21–51)
MCH RBC QN AUTO: 30.7 PG (ref 27–31)
MCV RBC AUTO: 94.5 FL (ref 81–99)
MONOCYTES # BLD AUTO: 0.9 THOU/UL (ref 0.11–0.59)
MONOCYTES NFR BLD AUTO: 10.8 % (ref 0–10)
NEUTROPHILS # BLD AUTO: 5 THOU/UL (ref 1.4–6.5)
NEUTROPHILS NFR BLD AUTO: 63.4 % (ref 42–75)
PLATELET # BLD AUTO: 255 THOU/UL (ref 130–400)
POTASSIUM SERPL-SCNC: 2.9 MMOL/L (ref 3.5–5.1)
RBC # BLD AUTO: 4.1 MILL/UL (ref 4.2–5.4)
SODIUM SERPL-SCNC: 132 MMOL/L (ref 136–145)
WBC # BLD AUTO: 7.9 THOU/UL (ref 4.8–10.8)

## 2018-03-28 RX ADMIN — Medication SCH: at 09:07

## 2018-03-28 NOTE — DIS
DATE OF ADMISSION:  03/24/2018

 

DATE OF DISCHARGE:  03/28/2018

 

ADMITTING COMPLAINT:  Acute encephalopathy, altered mental status, osteoporosis, hypertension, atrial
 fibrillation, history of cerebrovascular accident, Parkinson's disease, dementia, hypothyroidism, dy
slipidemia, gastroesophageal reflux disease, and chronic deconditioning.

 

DISCHARGE DIAGNOSES:  Acute encephalopathy, multifactorial, urinary tract infection, osteoarthritis, 
hypertension, atrial fibrillation stable, history of cerebrovascular accident stable, Parkinson's dis
ease stable, dementia  stable, hypertension stable, dyslipidemia stable, gastroesophageal reflux dise
ase stable, chronic deconditioning, stable.

 

HOSPITAL COURSE:  This is an 87-year-old female admitted to the Internal Medicine Team from [facility] for change in mental status.  The patient was found to have Corynebacterium, gram negative teri, U
TI.  At time of admission, she was started on antibiotic.  The patient's vital signs were monitored c
losely at point in time of discharge.  The patient had potassium of 2.9; however, she had replacement
 potassium being given to her and repeats to be done at the nursing home for further followup and car
e.  Hemoglobin was stable.  Patient's condition slowly improved and was presumptive baseline upon susan
e of discharge.  No family at bedside.  The patient is to go to her nursing home and follow up with mireya ordaz primary care doctor, therefore further management taken care.

 

DISPOSITION:  Nursing home.

 

MEDICATIONS:  See MAR.  Prescription for Levaquin given 250 daily for 7 days for UTI.

 

DIET:  Low fat, low calorie, high fiber diet.

 

ACTIVITY:  As tolerated with assistance.

 

CONDITION:  Stable.

 

PROGNOSIS:  Good.

 

DISPOSITION:  Guarded.

 

Follow up with PCP in 5-7 days.